# Patient Record
Sex: FEMALE | Race: BLACK OR AFRICAN AMERICAN
[De-identification: names, ages, dates, MRNs, and addresses within clinical notes are randomized per-mention and may not be internally consistent; named-entity substitution may affect disease eponyms.]

---

## 2019-10-06 ENCOUNTER — HOSPITAL ENCOUNTER (EMERGENCY)
Dept: HOSPITAL 72 - EMR | Age: 29
Discharge: HOME | End: 2019-10-06
Payer: SELF-PAY

## 2019-10-06 VITALS — WEIGHT: 135 LBS | HEIGHT: 63 IN | BODY MASS INDEX: 23.92 KG/M2

## 2019-10-06 VITALS — SYSTOLIC BLOOD PRESSURE: 96 MMHG | DIASTOLIC BLOOD PRESSURE: 55 MMHG

## 2019-10-06 VITALS — SYSTOLIC BLOOD PRESSURE: 100 MMHG | DIASTOLIC BLOOD PRESSURE: 72 MMHG

## 2019-10-06 DIAGNOSIS — F12.10: ICD-10-CM

## 2019-10-06 DIAGNOSIS — R10.13: Primary | ICD-10-CM

## 2019-10-06 DIAGNOSIS — J45.909: ICD-10-CM

## 2019-10-06 DIAGNOSIS — Z20.2: ICD-10-CM

## 2019-10-06 PROCEDURE — 96374 THER/PROPH/DIAG INJ IV PUSH: CPT

## 2019-10-06 PROCEDURE — 96372 THER/PROPH/DIAG INJ SC/IM: CPT

## 2019-10-06 PROCEDURE — 99284 EMERGENCY DEPT VISIT MOD MDM: CPT

## 2019-10-06 NOTE — NUR
ED Nurse Note:Patient walked to ER c/o abdominal pain after eating pizza. no 
episodes of diarrhea and constipation. No n/v episodes per patient. Bowel 
sounds present. Patient stated that her feeling of discomfort is like 
indigestion.

## 2019-10-06 NOTE — EMERGENCY ROOM REPORT
History of Present Illness


General


Chief Complaint:  Abdominal Pain


Source:  Patient





Present Illness


HPI


29-year-old female complaining of upper abdominal pain since yesterday after 

eating a pizza.


Pain is 6 out of 10, feels burning and like something stuck.


Denies fever, vomiting, diarrhea, shortness of breath, chest pain.


Also concerned about STD exposure, requesting STD treatment.  Two male sexual 

partners in the last 6 months. 


Denies dysuria, vaginal discharge, vaginal pain.


Allergies:  


Coded Allergies:  


     No Known Allergies (Unverified , 10/6/19)





Patient History


Past Medical History:  asthma


Past Surgical History:  none


Social History:  Reports: smoking - marijuana; Denies: alcohol use, drug use


Last Menstrual Period:  9/26/19


Pregnant Now:  No





Nursing Documentation-[facility]


Past Medical History:  No History, Except For


Hx Asthma:  Yes





Review of Systems


All Other Systems:  negative except mentioned in HPI





Physical Exam





Vital Signs








  Date Time  Temp Pulse Resp B/P (MAP) Pulse Ox O2 Delivery O2 Flow Rate FiO2


 


10/6/19 14:04 97.3 77 18 92/65 (74) 95 Room Air  








Respiratory:  chest non-tender, lungs clear, normal breath sounds, speaking 

full sentences


Cardiovascular #1:  regular rate, rhythm, no edema


Gastrointestinal:  non tender, soft, non-distended, no guarding


Neurologic:  alert, oriented x3, responsive, motor strength/tone normal, 

sensory intact, speech normal





Medical Decision Making


PA Attestation


This patient was seen under the direct supervision of Dr. Clemons, who directed 

all aspects of care and diagnostic interpretation.


Diagnostic Impression:  


 Primary Impression:  


 Exposure to sexually transmitted disease (STD)


 Additional Impression:  


 Epigastric abdominal pain


ER Course


ED course


HPI: 29-year-old female complaining of upper abdominal pain since yesterday 

after eating a pizza.


Pain is 6 out of 10, feels burning and like something stuck.


Denies fever, vomiting, diarrhea, shortness of breath, chest pain.


Also concerned about STD exposure, requesting STD treatment.  Two male sexual 

partners in the last 6 months. 


Denies dysuria, vaginal discharge, vaginal pain.





Ddx;


gastritis, GERD, STD





HPI & PE consistent with: Epigastric abdominal pain





Orders/ Interventions: 


Patient medicated with Mylanta and Viscous Lidocaine, with complete resolution 

of symptoms.


Rocephin 250mg IM and azithromycin 1gm PO given in ER. 





Disposition:


Patient discharged with Rx for famotidine.


At this time pt. is stable for d/c to home. Advised diet modifications: avoid 

spicy and acidic food, raw food, fried foods, dairy products, eat smaller meals

, avoid eating right


before bed, elevate head of bed. Advised to increase fluids intake and rest, 

avoid strenuous activities. No sexual activity for 7 days. Will provide printed 

patient care instructions, and any necessary prescriptions. Care plan and 

follow up instructions have been discussed with the patient prior to discharge.





Please note that this Emergency Department Report was dictated using [employer] technology software, occasionally this can lead to 

erroneous entry secondary to interpretation by the dictation equipment.





Last Vital Signs








  Date Time  Temp Pulse Resp B/P (MAP) Pulse Ox O2 Delivery O2 Flow Rate FiO2


 


10/6/19 15:30 98.0 62 16 96/55 98 Room Air  








Status:  unchanged


Disposition:  HOME, SELF-CARE


Condition:  Improved


Scripts


Famotidine (FAMOTIDINE) 20 Mg Tablet


20 MG ORAL DAILY, #10 TAB 0 Refills


   Prov: Doe Vasquez         10/6/19


Patient Instructions:  Abdominal Pain, Adult





Additional Instructions:  


Followup with PCP in 2 days or return to ER if worsening symptoms, new symptoms 

or sudden change in condition











Doe Vasquez Oct 6, 2019 16:19